# Patient Record
Sex: FEMALE | Race: WHITE | NOT HISPANIC OR LATINO | Employment: OTHER | URBAN - METROPOLITAN AREA
[De-identification: names, ages, dates, MRNs, and addresses within clinical notes are randomized per-mention and may not be internally consistent; named-entity substitution may affect disease eponyms.]

---

## 2022-10-10 ENCOUNTER — PRE VISIT (OUTPATIENT)
Dept: OPHTHALMOLOGY | Facility: CLINIC | Age: 72
End: 2022-10-10

## 2022-10-10 NOTE — TELEPHONE ENCOUNTER
FUTURE VISIT INFORMATION      FUTURE VISIT INFORMATION:    Date: 12/5/22    Time: 7:30am    Location: [facility] [place]  REFERRAL INFORMATION:    Referring providers clinic:  [facility]    Reason for visit/diagnosis  Ptosis    RECORDS REQUESTED FROM:       Clinic name Comments Records Status Imaging Status   [facility] Request for recs sent 10/10- Per [facility] not eye records since 2007

## 2023-11-21 NOTE — TELEPHONE ENCOUNTER
FUTURE VISIT INFORMATION      FUTURE VISIT INFORMATION:  Date: 2/12/24  Time: 9:00am  Location: [facility] [place]  REFERRAL INFORMATION:  Referring providers clinic:  [facility]  Reason for visit/diagnosis  Ptosis     RECORDS REQUESTED FROM:         Clinic name Comments Records Status Imaging Status   [facility] Request for recs sent 10/10- Per [facility] not eye records since 2007

## 2024-02-12 ENCOUNTER — PRE VISIT (OUTPATIENT)
Dept: OPHTHALMOLOGY | Facility: CLINIC | Age: 74
End: 2024-02-12

## 2024-02-12 ENCOUNTER — OFFICE VISIT (OUTPATIENT)
Dept: OPHTHALMOLOGY | Facility: CLINIC | Age: 74
End: 2024-02-12
Payer: COMMERCIAL

## 2024-02-12 DIAGNOSIS — H02.834 DERMATOCHALASIS OF BOTH UPPER EYELIDS: Primary | ICD-10-CM

## 2024-02-12 DIAGNOSIS — H02.831 DERMATOCHALASIS OF BOTH UPPER EYELIDS: Primary | ICD-10-CM

## 2024-02-12 PROCEDURE — 99203 OFFICE O/P NEW LOW 30 MIN: CPT | Mod: GC | Performed by: OPHTHALMOLOGY

## 2024-02-12 ASSESSMENT — VISUAL ACUITY
OS_SC: 20/40
OD_PH_SC: 20/30
OS_PH_SC: 20/25
METHOD: SNELLEN - LINEAR
OD_PH_SC+: -1
OD_SC: 20/40
OS_PH_SC+: -1
OS_SC+: -1

## 2024-02-12 ASSESSMENT — LEVATOR FUNCTION
OD_LEVATOR: 15
OS_LEVATOR: 15

## 2024-02-12 ASSESSMENT — EXTERNAL EXAM - LEFT EYE: OS_EXAM: NORMAL

## 2024-02-12 ASSESSMENT — TONOMETRY
OD_IOP_MMHG: 16
IOP_METHOD: ICARE
OS_IOP_MMHG: 16

## 2024-02-12 ASSESSMENT — MARGIN REFLEX DISTANCE
OS_MRD1: 4
OD_MRD1: 4

## 2024-02-12 ASSESSMENT — SLIT LAMP EXAM - LIDS
COMMENTS: DERMATOCHALASIS WITH EXCESS SKIN RESTING ON LASHES
COMMENTS: DERMATOCHALASIS WITH EXCESS SKIN RESTING ON LASHES

## 2024-02-12 ASSESSMENT — CONF VISUAL FIELD: OS_SUPERIOR_NASAL_RESTRICTION: 3

## 2024-02-12 ASSESSMENT — EXTERNAL EXAM - RIGHT EYE: OD_EXAM: NORMAL

## 2024-02-12 NOTE — PATIENT INSTRUCTIONS
BLEPHAROPLASTY    Your eyes are often the first thing people notice about you and are an important aspect of your overall appearance. As we age, the tone and shape of our eyelids can loosen and sag. Heredity and sun exposure also contribute to this process. This excess, puffy or lax skin can make you appear more tired or appear older. Eyelid surgery or blepharoplasty (pronounced  dbaa-t-fx-plasty ) can give the eyes a more youthful look by removing excess skin, bulging fat, and lax muscle from the upper or lower eyelids. If the sagging upper eyelid skin obstructs peripheral vision, blepharoplasty can eliminate the obstruction and expand the visual field.     Upper Blepharoplasty     For the upper eyelids, excess skin and fat are removed through an incision hidden in the natural eyelid crease. If the lid is droopy (ptosis), the muscle that raises the upper eyelid can be tightened. The incision is then closed with fine sutures.     Lower Blepharoplasty     Fat in the lower eyelids can be removed or repositioned through an incision hidden on the inner surface of the eyelid.  If there is excessive skin in the lower lid, the skin can be removed through incision is made just below the lashes. Fat can be removed or repositioned through this incision, and the excess skin removed. The incision is then closed with fine sutures.     Upper and Lower Blepharoplasty     Upper and lower blepharoplasty can be performed together and also can be combined with other procedures such as eyebrow or forehead lift, midface lift, face lift, neck lift, or laser skin resurfacing.  The procedures are typically performed as an outpatient procedure and typically take 45 min to 1.5 hours to perform.  Most patients can return to normal activities within 1-2 weeks. Makeup may be worn to camouflage any bruising after one week.       Who Should Perform A Blepharoplasty?     When choosing a surgeon to perform blepharoplasty, look for a cosmetic and  reconstructive surgeon who specializes in the eyelids, orbit, and tear drain system. Dr. Kilgore s membership in the [employer] Society of Ophthalmic Plastic and Reconstructive [employer] (ASOPRS) indicates he is not only a board certified ophthalmologist who knows the anatomy and structure of the eyelids and orbit, but also has had extensive training in ophthalmic plastic reconstructive and cosmetic surgery.

## 2024-02-12 NOTE — PROGRESS NOTES
Chief Complaints and History of Present Illnesses   Patient presents with    Droopy Eye Lid Evaluation     Chief Complaint(s) and History of Present Illness(es)     Droopy Eye Lid Evaluation    In right upper lid and left upper lid.  Duration of 1 year.  It is worse   when reading and when tired.  Since onset it is gradually worsening.    Associated signs and symptoms include chin-up position.  Response to   treatment was no improvement.  Pain was noted as 0/10.           Comments    Patient was recommended to see Dr. Kilgore by a friend.  Notes   dermatochalasis of both upper lids gradually progressing over the last few   years as she has ages.   She notes that it is not as comfortable to read   because of the lids.  Denies eye pain.  Occasional floaters, but nothing   new.  Denies flashes     Maryam Nicholas on 2/12/2024 at 8:49 AM         FUNCTIONAL COMPLAINTS RELATED TO DROOPY EYELIDS/BROWS:  Jyothi Dickinson describes upper lids interfering with superior visual field and interfering with activities of daily living including reading, driving and watching television.     EXAM:   Dominant eye left eye    MRD1: Right eye 4 mm   Left eye 4 mm  Dermatochalasis with excess skin touching eyelashes     Assessment & Plan     Jyothi Dickinson is a 73 year old female with the following diagnoses:   1. Dermatochalasis of both upper eyelids       POH: none; no history of surgery  PMH: none; botox to upper frontalis and to mid-brow area (most recent session was 2 weeks ago)    FUNCTIONAL COMPLAINTS RELATED TO DROOPY EYELIDS/BROWS:  Jyothi Dickinson describes upper lids interfering with superior visual field and interfering with activities of daily living including reading, driving and watching television.     EXAM:     MRD1: Right eye 4   Left eye 4  Dermatochalasis with excess skin touching eyelashes   Brow ptosis with brow resting below superior orbital rim right        PLAN:  Bilateral upper blepharoplasty (SON) + Right  browpexy  WIll have her return after Botox has worn off  Return to clinic 2 months and do PVF both eyes         Anum Lee MD  Oculoplastic Surgery Fellow    Attending Physician Attestation:  I have seen and examined this patient with the fellow .  I have confirmed and edited as necessary the chief complaint(s), history of present illness, review of systems, relevant history, and examination findings as documented by others.  I have personally reviewed the relevant tests, images, and reports as documented above.  I have confirmed and edited as necessary the assessment and plan and agree with this note.    - Josue Kilgore MD 9:24 AM 2/12/2024    Today with Jyothi Pleitez, I reviewed the indications, risks, benefits, and alternatives of the proposed surgical procedure including, but not limited to, failure obtain the desired result  and need for additional surgery, bleeding, infection, loss of vision, loss of the eye, and the remote possibility of permanent damage to any organ system or death with the use of anesthesia.  I provided multiple opportunities for the questions, answered all questions to the best of my ability, and confirmed that my answers and my discussion were understood.     - Josue Kilgore MD 9:24 AM 2/12/2024

## 2024-04-22 ENCOUNTER — OFFICE VISIT (OUTPATIENT)
Dept: OPHTHALMOLOGY | Facility: CLINIC | Age: 74
End: 2024-04-22
Payer: COMMERCIAL

## 2024-04-22 DIAGNOSIS — H02.834 DERMATOCHALASIS OF BOTH UPPER EYELIDS: Primary | ICD-10-CM

## 2024-04-22 DIAGNOSIS — H57.811 BROW PTOSIS, RIGHT: ICD-10-CM

## 2024-04-22 DIAGNOSIS — H02.831 DERMATOCHALASIS OF BOTH UPPER EYELIDS: Primary | ICD-10-CM

## 2024-04-22 PROCEDURE — 92285 EXTERNAL OCULAR PHOTOGRAPHY: CPT | Mod: GC | Performed by: OPHTHALMOLOGY

## 2024-04-22 PROCEDURE — 99214 OFFICE O/P EST MOD 30 MIN: CPT | Mod: GC | Performed by: OPHTHALMOLOGY

## 2024-04-22 PROCEDURE — 92082 INTERMEDIATE VISUAL FIELD XM: CPT | Mod: GC | Performed by: OPHTHALMOLOGY

## 2024-04-22 ASSESSMENT — CONF VISUAL FIELD
OD_SUPERIOR_TEMPORAL_RESTRICTION: 0
OS_NORMAL: 1
OD_INFERIOR_NASAL_RESTRICTION: 0
OS_INFERIOR_NASAL_RESTRICTION: 0
OS_INFERIOR_TEMPORAL_RESTRICTION: 0
OD_NORMAL: 1
OS_SUPERIOR_NASAL_RESTRICTION: 0
OD_SUPERIOR_NASAL_RESTRICTION: 0
OS_SUPERIOR_TEMPORAL_RESTRICTION: 0
METHOD: COUNTING FINGERS
OD_INFERIOR_TEMPORAL_RESTRICTION: 0

## 2024-04-22 ASSESSMENT — VISUAL ACUITY
OD_PH_SC: 20/25
OD_SC: 20/30
METHOD: SNELLEN - LINEAR
OS_SC: 20/30
OS_PH_SC: 20/25

## 2024-04-22 ASSESSMENT — EXTERNAL EXAM - LEFT EYE: OS_EXAM: NORMAL

## 2024-04-22 ASSESSMENT — TONOMETRY
IOP_METHOD: ICARE
OS_IOP_MMHG: 13
OD_IOP_MMHG: 13

## 2024-04-22 ASSESSMENT — EXTERNAL EXAM - RIGHT EYE: OD_EXAM: BROW PTOSIS

## 2024-04-22 NOTE — PROGRESS NOTES
Chief Complaint(s) and History of Present Illness(es)       Droopy Eye Lid Evaluation    Duration of 2 months.  Since onset it is gradually worsening.  Associated signs and symptoms include Negative for blurred vision and double vision.  Response to treatment was no improvement.  Pain was noted as 0/10.        Comments    Patient is here for recheck.  Last had Botox approximately slightly before February 5th.  It is worse when reading and tired.      Maryam Nicholas on 4/22/2024 at 7:35 AM          FUNCTIONAL COMPLAINTS RELATED TO DROOPY EYELIDS/BROWS:  Jyothi Dickinson describes upper lids interfering with superior visual field and interfering with activities of daily living including reading, driving and watching television.     Blood thinners: No  Pacemaker: No  Heart/lung disease: No      Assessment & Plan     Jyothi Dickinson is a 73 year old female with the following diagnoses:   1. Dermatochalasis of both upper eyelids    2. Brow ptosis, right      POH: none; no history of surgery  PMH: none; botox to upper frontalis and to mid-brow area (most recent session was 1st week of February)    FUNCTIONAL COMPLAINTS RELATED TO DROOPY EYELIDS/BROWS:  Jyothi Dickinson describes upper lids interfering with superior visual field and interfering with activities of daily living including reading, driving and watching television.     EXAM:     MRD1: Right eye 4   Left eye 4  Dermatochalasis with excess skin touching eyelashes each eye   Brow ptosis with tail of the brow resting below superior orbital rim, right    VISUAL FIELD:  Right eye untaped: 10 degrees Right eye taped: 50 degrees  Left eye untaped: 20 degrees  Left eye taped: 50 degrees    Right eye visual field improves by: 40 degrees  Left eye visual field improves by: 30 degrees    PLAN:  Bilateral upper blepharoplasty (SON) + Right browpexy        Beau Saha MD  Resident Physician, PGY-2  Department of Ophthalmology     Attending Physician Attestation:  I have  seen and examined this patient with the resident .  I have confirmed and edited as necessary the chief complaint(s), history of present illness, review of systems, relevant history, and examination findings as documented by others.  I have personally reviewed the relevant tests, images, and reports as documented above.  I have confirmed and edited as necessary the assessment and plan and agree with this note.    - Josue Kilgore MD 8:22 AM 4/22/2024    Today with Jyothi Dickinson, I reviewed the indications, risks, benefits, and alternatives of the proposed surgical procedure including, but not limited to, failure obtain the desired result  and need for additional surgery, bleeding, infection, loss of vision, loss of the eye, and the remote possibility of permanent damage to any organ system or death with the use of anesthesia.  I provided multiple opportunities for the questions, answered all questions to the best of my ability, and confirmed that my answers and my discussion were understood.     - Josue Kilgore MD 8:22 AM 4/22/2024

## 2024-04-22 NOTE — NURSING NOTE
Chief Complaints and History of Present Illnesses   Patient presents with    Droopy Eye Lid Evaluation     Chief Complaint(s) and History of Present Illness(es)       Droopy Eye Lid Evaluation              Duration: 2 months    Course: gradually worsening    Associated signs and symptoms: Negative for blurred vision and double vision    Response to treatment: no improvement    Pain scale: 0/10              Comments    Patient is here for recheck.  Last had Botox approximately slightly before February 5th.  It is worse when reading and tired.      Maryam Nicholas on 4/22/2024 at 7:35 AM

## 2024-04-23 ENCOUNTER — TELEPHONE (OUTPATIENT)
Dept: OPHTHALMOLOGY | Facility: CLINIC | Age: 74
End: 2024-04-23
Payer: COMMERCIAL

## 2024-04-23 PROBLEM — H02.831 DERMATOCHALASIS OF BOTH UPPER EYELIDS: Status: ACTIVE | Noted: 2024-04-22

## 2024-04-23 PROBLEM — H57.811 BROW PTOSIS, RIGHT: Status: ACTIVE | Noted: 2024-04-22

## 2024-04-23 PROBLEM — H02.834 DERMATOCHALASIS OF BOTH UPPER EYELIDS: Status: ACTIVE | Noted: 2024-04-22

## 2024-04-23 NOTE — TELEPHONE ENCOUNTER
Spoke with the patient and was able to confirm all scheduled information.     Patient is schedule for surgery with: Dr. Kilgore    Surgery Date: 7/10     Location: [facility]    H&P: to be completed by Primary Care team - patient instructed to schedule per patient, this will be scheduled with [facility]     Post-op: TBD - Dr. Kilgore will be OOO. Awaiting confirmation from Dr. Lee to cover post ops.     Patient will receive a phone call from pre-admission nurses 1-2 days prior to surgery with arrival time and NPO instructions.    Patient aware times are subject to change up until day before surgery.     Patient questions/concerns: N/A     Surgery packet was sent via [place] mail on 4/23      Karley Wallace on 4/23/2024 at 8:27 AM

## 2024-05-28 NOTE — TELEPHONE ENCOUNTER
Received message that patient wants to reschedule surgery. Surgery rescheduled to 9/4, post op appointment rescheduled.  Deb Banerjee on 5/28/2024 at 12:03 PM

## 2024-05-30 NOTE — NURSING NOTE
Chief Complaints and History of Present Illnesses   Patient presents with    Droopy Eye Lid Evaluation     Chief Complaint(s) and History of Present Illness(es)       Droopy Eye Lid Evaluation              Laterality: right upper lid and left upper lid    Duration: 1 year    Timing: when reading and when tired    Course: gradually worsening    Associated signs and symptoms: chin-up position    Response to treatment: no improvement    Pain scale: 0/10              Comments    Patient was recommended to see Dr. Kilgore by a friend.  Notes dermatochalasis of both upper lids gradually progressing over the last few years as she has ages.   She notes that it is not as comfortable to read because of the lids.  Denies eye pain.  Occasional floaters, but nothing new.  Denies flashes     Maryam Nicholas on 2/12/2024 at 8:49 AM                        Pt rqst diff MR. Pt currently on 750mg Robaxin TID and not having relief. Pt seen on 5/17/24 BK.     Pls advise. Thank you!

## 2024-06-15 ENCOUNTER — HEALTH MAINTENANCE LETTER (OUTPATIENT)
Age: 74
End: 2024-06-15

## 2024-08-22 NOTE — TELEPHONE ENCOUNTER
Patient called in advising that they need to Cx procedure on 9/4 with Dr. Kilgore. Pt was not ready to reschedule at this time. Karley Wallace on 8/22/2024 at 9:50 AM

## 2025-07-06 ENCOUNTER — HEALTH MAINTENANCE LETTER (OUTPATIENT)
Age: 75
End: 2025-07-06